# Patient Record
Sex: FEMALE | Race: WHITE
[De-identification: names, ages, dates, MRNs, and addresses within clinical notes are randomized per-mention and may not be internally consistent; named-entity substitution may affect disease eponyms.]

---

## 2017-02-05 ENCOUNTER — HOSPITAL ENCOUNTER (INPATIENT)
Dept: HOSPITAL 45 - C.EDB | Age: 82
LOS: 2 days | Discharge: HOME HEALTH | DRG: 158 | End: 2017-02-07
Attending: HOSPITALIST | Admitting: HOSPITALIST
Payer: COMMERCIAL

## 2017-02-05 VITALS
DIASTOLIC BLOOD PRESSURE: 91 MMHG | SYSTOLIC BLOOD PRESSURE: 169 MMHG | HEART RATE: 81 BPM | TEMPERATURE: 98.96 F | OXYGEN SATURATION: 93 %

## 2017-02-05 VITALS
OXYGEN SATURATION: 94 % | DIASTOLIC BLOOD PRESSURE: 83 MMHG | SYSTOLIC BLOOD PRESSURE: 166 MMHG | TEMPERATURE: 99.5 F | HEART RATE: 75 BPM

## 2017-02-05 VITALS
BODY MASS INDEX: 22.68 KG/M2 | WEIGHT: 141.1 LBS | HEIGHT: 66 IN | HEIGHT: 66 IN | BODY MASS INDEX: 22.68 KG/M2 | WEIGHT: 141.1 LBS

## 2017-02-05 DIAGNOSIS — Z87.891: ICD-10-CM

## 2017-02-05 DIAGNOSIS — Z86.718: ICD-10-CM

## 2017-02-05 DIAGNOSIS — K04.7: Primary | ICD-10-CM

## 2017-02-05 DIAGNOSIS — E03.9: ICD-10-CM

## 2017-02-05 DIAGNOSIS — L03.211: ICD-10-CM

## 2017-02-05 DIAGNOSIS — Z99.3: ICD-10-CM

## 2017-02-05 DIAGNOSIS — M21.821: ICD-10-CM

## 2017-02-05 DIAGNOSIS — M21.851: ICD-10-CM

## 2017-02-05 DIAGNOSIS — G35: ICD-10-CM

## 2017-02-05 DIAGNOSIS — Z96.0: ICD-10-CM

## 2017-02-05 DIAGNOSIS — M21.852: ICD-10-CM

## 2017-02-05 DIAGNOSIS — Z79.899: ICD-10-CM

## 2017-02-05 DIAGNOSIS — Z96.642: ICD-10-CM

## 2017-02-05 DIAGNOSIS — Z79.891: ICD-10-CM

## 2017-02-05 DIAGNOSIS — D64.9: ICD-10-CM

## 2017-02-05 DIAGNOSIS — M81.0: ICD-10-CM

## 2017-02-05 LAB
ANION GAP SERPL CALC-SCNC: 11 MMOL/L (ref 3–11)
BASOPHILS # BLD: 0 K/UL (ref 0–0.2)
BASOPHILS NFR BLD: 0 %
BUN SERPL-MCNC: 6 MG/DL (ref 7–18)
BUN/CREAT SERPL: 11.5 (ref 10–20)
CALCIUM SERPL-MCNC: 9.1 MG/DL (ref 8.5–10.1)
CHLORIDE SERPL-SCNC: 98 MMOL/L (ref 98–107)
CO2 SERPL-SCNC: 28 MMOL/L (ref 21–32)
COMPLETE: YES
CREAT CL PREDICTED SERPL C-G-VRATE: 80.7 ML/MIN
CREAT SERPL-MCNC: 0.52 MG/DL (ref 0.6–1.2)
EOSINOPHIL NFR BLD AUTO: 196 K/UL (ref 130–400)
GLUCOSE SERPL-MCNC: 98 MG/DL (ref 70–99)
HCT VFR BLD CALC: 42.6 % (ref 37–47)
IG%: 0.1 %
IMM GRANULOCYTES NFR BLD AUTO: 6.4 %
INR PPP: 1 (ref 0.9–1.1)
LYMPHOCYTES # BLD: 0.57 K/UL (ref 1.2–3.4)
MCH RBC QN AUTO: 29.9 PG (ref 25–34)
MCHC RBC AUTO-ENTMCNC: 34 G/DL (ref 32–36)
MCV RBC AUTO: 87.8 FL (ref 80–100)
MONOCYTES NFR BLD: 13.9 %
NEUTROPHILS # BLD AUTO: 0 %
NEUTROPHILS NFR BLD AUTO: 79.6 %
PARTIAL THROMBOPLASTIN RATIO: 1
PMV BLD AUTO: 11.4 FL (ref 7.4–10.4)
POTASSIUM SERPL-SCNC: 3.4 MMOL/L (ref 3.5–5.1)
PROTHROMBIN TIME: 10.7 SECONDS (ref 9–12)
RBC # BLD AUTO: 4.85 M/UL (ref 4.2–5.4)
SODIUM SERPL-SCNC: 137 MMOL/L (ref 136–145)
WBC # BLD AUTO: 8.86 K/UL (ref 4.8–10.8)

## 2017-02-05 RX ADMIN — BACLOFEN PRN MG: 20 TABLET ORAL at 22:22

## 2017-02-05 RX ADMIN — SODIUM CHLORIDE AND POTASSIUM CHLORIDE SCH MLS/HR: 9; 1.49 INJECTION, SOLUTION INTRAVENOUS at 20:43

## 2017-02-05 RX ADMIN — Medication SCH TAB: at 20:05

## 2017-02-05 RX ADMIN — LORAZEPAM SCH MG: 1 TABLET ORAL at 22:23

## 2017-02-05 RX ADMIN — HEPARIN SODIUM SCH UNIT: 10000 INJECTION, SOLUTION INTRAVENOUS; SUBCUTANEOUS at 22:30

## 2017-02-05 RX ADMIN — AMITRIPTYLINE HYDROCHLORIDE SCH MG: 10 TABLET, FILM COATED ORAL at 22:20

## 2017-02-05 RX ADMIN — CLINDAMYCIN PHOSPHATE SCH MLS/HR: 150 INJECTION, SOLUTION INTRAVENOUS at 20:44

## 2017-02-05 NOTE — EMERGENCY ROOM VISIT NOTE
History


Report prepared by Ivan:  Keke Kidd


Under the Supervision of:  Dr. Dave Griggs M.D.


First contact with patient:  14:39


Chief Complaint:  OTHER COMPLAINT


Stated Complaint:  SWOLLEN FACE





History of Present Illness


The patient is a 81 year old female who presents to the Emergency Room with 

complaints of worsening right sided jaw swelling that began 2 days ago. The 

patient notes that she initially had some pain to her bottom right first molar 

a few days ago and subsequently developed the swelling. She does have increased 

pain with cold food and liquid on the right side of her mouth. She had a dose 

of Dilaudid about an hour ago.  She is chronically on MS.  Denies fever, body 

aches, chills, vomiting, or other complaints.





   Source of History:  patient


   Onset:  2 days ago


   Position:  other (right jaw)


   Quality:  other (swelling)


   Timing:  worsening


   Associated Symptoms:  No chills, No fevers, No vomiting





Review of Systems


See HPI for pertinent positives & negatives. A total of 10 systems reviewed and 

were otherwise negative.





Past Medical & Surgical


Medical Problems:


(1) Deep venous thrombosis


(2) Diffuse cellulitis of face


(3) Endocarditis


(4) Hypothyroidism


(5) Multiple sclerosis


(6) Osteoporosis


(7) s/p hysterectomy


(8) s/p left ROSI


(9) s/p tonsillectomy








Family History





Cancer


Diabetes mellitus


Heart disease


Hypertension





Social History


Smoking Status:  Former Smoker


Alcohol Use:  none


Occupation Status:  retired





Current/Historical Medications


Scheduled


Amitriptyline Hcl (Elavil *), 20 MG PO HS


Baclofen (Baclofen *), 20 MG PO QID PRN


Calcium/Vitamin D (Caltrate 600 Plus *), 1 TAB PO BID


Cholecalciferol (Vitamin D 400 Iu), 800 INTER.UNIT PO DAILY


Hydromorphone Hcl (Dilaudid), 6 MG PO AS DIRECTED


Hydromorphone Hcl (Dilaudid), 4 MG PO AS DIRECTED


Levothyroxine Sodium (Levoxyl), 0.05 MG PO DAILY


Lorazepam (Ativan *), 1 MG PO BID


Multivitamins/Minerals (Mvi With Minerals), 1 TAB PO DAILY


Polyethylene (Miralax Powder Packet), 17 GM PO DAILY PRN





Scheduled PRN


Furosemide (Lasix), 1 TAB PO DAILY PRN for SWELLING





Allergies


Coded Allergies:  


     Nitrofurantoin (Unverified  Allergy, Severe, FEVER, 2/5/17)


     Ceftriaxone (Verified  Allergy, Mild, 2/5/17)


     Penicillins (Verified  Allergy, Mild, 2/5/17)


     Sulbactam (Verified  Allergy, Mild, 2/5/17)


     Clavulanic Acid (Verified  Allergy, Unknown, "BETA-LACTAMASE INHIBITORS" 

ALLERGY, 2/5/17)


     Rifampin (Verified  Allergy, Unknown, POSSIBLE DRUG FEVER, 2/5/17)


     Tazobactam (Verified  Allergy, Unknown, "BETA-LACTAMASE INHIBITORS" ALLERGY

, 2/5/17)





Physical Exam


Vital Signs











  Date Time  Temp Pulse Resp B/P Pulse Ox O2 Delivery O2 Flow Rate FiO2


 


2/5/17 18:15  87 16 173/91 95 Room Air  


 


2/5/17 16:07  78 18 171/88 93 Room Air  


 


2/5/17 14:15 37.0 92 18 186/90 94 Room Air  











Physical Exam





Constitutional: Vital signs reviewed.


Eyes: Pupils are equal round reactive to light.  Conjunctiva are noninjected.  


ENT: Mild percussion tenderness to the right mandibular first molar. 

Significant soft tissue swelling to the right face with cellulitis extending 

into the neck. No submandibular tenderness or firmness. No elevation of the 

tongue. Mucous membranes are moist.  Neck supple without meningeal signs.


Respiratory: Clear to auscultation bilaterally.  Breath sounds are equal 

bilaterally. 


Cardiovascular: Regular rate and rhythm.  No rubs or gallops.


GI: Soft, nondistended and nontender.  Bowel sounds are present.


Musculoskeletal: No peripheral edema.  No lower extremity tenderness.  


Integumentary: No cyanosis.


Neurological: The patient is awake and alert.  She is wheelchair bound.


Psychiatric: Normal affect.





Medical Decision & Procedures


ER Provider


Diagnostic Interpretation:


CT results as stated below per my review and radiologist interpretation. 











CT neck with intravenous contrast.





HISTORY: Right neck swelling.  eval for abscess/babak





TECHNIQUE: Multiaxial CT images of the neck were performed following the use of


intravenous contrast.





COMPARISON STUDY:  None.





FINDINGS: The paranasal sinuses and mastoid air cells are clear. The visualized


brain parenchyma and orbits are unremarkable. The parotid and submandibular


glands appear symmetric. Difficult evaluation of the neck due to patient's


positioning. The major mucosal airway surfaces appear to be grossly intact.


Prevertebral soft tissues and the epiglottis are normal in thickness. No


pneumothorax. The trachea is patent. Moderate calcified plaque within the


bilateral carotid bifurcations. This results in 30% stenosis at the proximal


right internal carotid artery. No loculated fluid collections to suggest an


abscess. Skin thickening and subcutaneous fat stranding along the right side of


the jaw which appears to extend into the right sublingual area. However, this is


difficult to confirm due to patient's positioning and the dental hardware


artifact. There is also mild edema surrounding the inferior right


sternocleidomastoid muscle. There is also mild edema surrounding the right


masseter muscle. Multiple right lower molar caries and periapical lucencies


which measure up to 5 mm in size. This may account for the right lower


mandibular soft tissue edema.





IMPRESSION:  





1. Difficult evaluation due to patient's positioning.


2. Right lateral mandibular and lower neck subcutaneous fat stranding/edema


which may extend towards the right sublingual space. However, this is difficult


to assess due to patient positioning and the dental hardware artifact. Clinical


correlation recommended to evaluate for Babak's angina. There are no loculated


fluid collections to suggest an abscess within the soft tissues at this time.


3. Right lower molar caries and periapical lucencies which may account for the


right mandibular/neck soft tissue swelling. 











Electronically signed by:  Alejo Schneider M.D.


2/5/2017 5:25 PM





Dictated Date/Time:  2/5/2017 5:14 PM





Laboratory Results


2/5/17 15:40








Red Blood Count 4.85, Mean Corpuscular Volume 87.8, Mean Corpuscular Hemoglobin 

29.9, Mean Corpuscular Hemoglobin Concent 34.0, Mean Platelet Volume 11.4, 

Neutrophils (%) (Auto) 79.6, Lymphocytes (%) (Auto) 6.4, Monocytes (%) (Auto) 

13.9, Eosinophils (%) (Auto) 0.0, Basophils (%) (Auto) 0.0, Neutrophils # (Auto

) 7.05, Lymphocytes # (Auto) 0.57, Monocytes # (Auto) 1.23, Eosinophils # (Auto

) 0.00, Basophils # (Auto) 0.00





2/5/17 15:40

















Test


  2/5/17


15:40


 


White Blood Count


  8.86 K/uL


(4.8-10.8)


 


Red Blood Count


  4.85 M/uL


(4.2-5.4)


 


Hemoglobin


  14.5 g/dL


(12.0-16.0)


 


Hematocrit 42.6 % (37-47) 


 


Mean Corpuscular Volume


  87.8 fL


()


 


Mean Corpuscular Hemoglobin


  29.9 pg


(25-34)


 


Mean Corpuscular Hemoglobin


Concent 34.0 g/dl


(32-36)


 


Platelet Count


  196 K/uL


(130-400)


 


Mean Platelet Volume


  11.4 fL


(7.4-10.4)


 


Neutrophils (%) (Auto) 79.6 % 


 


Lymphocytes (%) (Auto) 6.4 % 


 


Monocytes (%) (Auto) 13.9 % 


 


Eosinophils (%) (Auto) 0.0 % 


 


Basophils (%) (Auto) 0.0 % 


 


Neutrophils # (Auto)


  7.05 K/uL


(1.4-6.5)


 


Lymphocytes # (Auto)


  0.57 K/uL


(1.2-3.4)


 


Monocytes # (Auto)


  1.23 K/uL


(0.11-0.59)


 


Eosinophils # (Auto)


  0.00 K/uL


(0-0.5)


 


Basophils # (Auto)


  0.00 K/uL


(0-0.2)


 


RDW Standard Deviation


  44.7 fL


(36.4-46.3)


 


RDW Coefficient of Variation


  13.9 %


(11.5-14.5)


 


Immature Granulocyte % (Auto) 0.1 % 


 


Immature Granulocyte # (Auto)


  0.01 K/uL


(0.00-0.02)


 


Prothrombin Time


  10.7 SECONDS


(9.0-12.0)


 


Prothromb Time International


Ratio 1.0 (0.9-1.1) 


 


 


Activated Partial


Thromboplast Time 26.6 SECONDS


(21.0-31.0)


 


Partial Thromboplastin Ratio 1.0 


 


Anion Gap


  11.0 mmol/L


(3-11)


 


Est Creatinine Clear Calc


Drug Dose 80.7 ml/min 


 


 


Estimated GFR (


American) 104.6 


 


 


Estimated GFR (Non-


American 90.2 


 


 


BUN/Creatinine Ratio 11.5 (10-20) 


 


Calcium Level


  9.1 mg/dl


(8.5-10.1)





Laboratory results as reviewed by me.





Medications Administered











 Medications


  (Trade)  Dose


 Ordered  Sig/Jaret


 Route  Start Time


 Stop Time Status Last Admin


Dose Admin


 


 Clindamycin


 Phosphate/Dextrose


  (Cleocin Iv/


 Dextrose


 Add-[place] 100ML)  106 ml @ 


 100 mls/hr  ONE  ONCE


 IV  2/5/17 15:00


 2/5/17 16:03 DC 2/5/17 16:02


100 MLS/HR


 


 Hydromorphone HCl


  (Dilaudid Tab)  4 mg  Q3H  PRN


 PO  2/5/17 18:30


 2/19/17 18:29  2/5/17 18:59


4 MG











ED Course


1443: The patient was evaluated in room B8. A complete history and physical 

exam was performed.





1500: Ordered Clindamycin Phosphate 900 mg/Dextrose 106 ml @ 100 mls/hr IV. 





1748: I told the patient her test results and recommended hospitalization after 

I speak with an oral surgeon. 





1754: I discussed the case with Dr. Chaudhary - Oral Surgery. He said that there 

is no need for surgical intervention at this time and he will see the patient 

in the hospital once she is on IV antibiotics and her swelling comes down. 





1800: I reassessed the patient and talked to her about the plan. She was 

agreeable. 





1802: I discussed the case with Dee Dee Posadas PA-C - [facility] 

[facility]. The patient will be evaluated for further management.





Medical Decision


This is an 81-year-old female who presents with right facial pain and swelling.

  Differential diagnosis includes cellulitis, infection, facial abscess, 

periapical abscess, Babak angina.  I did perform a limited focused review of 

portions of the patient's old chart on the electronic medical record. The 

patient has had no recent pertinent visits to this hospital.





I did evaluate the patient as noted above.  IV access was established.  I did 

treat the patient with clindamycin IV.  She did have Dilaudid prior to arrival 

and so not require any pain medication here.  I did order blood cultures.  I 

did order and review the patient's blood work as noted in the electronic 

medical record.  I did order a CT of the soft tissue neck.  I did review the 

images myself as well as the radiology report as described above.  She does 

have significant facial cellulitis.  I do not believe that clinically she has a 

Babak's angina.  There is no elevation of tongue or firmness in the submental 

region.  She does have some swelling on the right jaw but this is mostly 

redundant tissue.  I did discuss the test results with the patient.  I did 

recommend hospitalization for IV antibiotics.  I did discuss the case with Dr. Chaudhary of oral surgery.  I did discuss case with the hospitalist and case 

manager.





Consults


Time Called:  1750


Consulting Physician:  Dr. Chaudhary - Oral Surgery


Returned Call:  [phone]


I discussed the case with him. He said that there is no need for surgical 

intervention at this time and he will see the patient in the hospital once she 

is on IV antibiotics and her swelling comes down.


Additional Consults:  


   Time Called:  1758


   Consulted Physician:  [facility]


   Returned Call:  [phone]


Additional Comments:


I discussed the case with her. The patient will be evaluated for further 

management.





Impression





 Primary Impression:  


 Facial cellulitis


 Additional Impression:  


 Periapical abscess





Scribe Attestation


The scribe's documentation has been prepared under my direct and personally 

reviewed by me in its entirety. I confirm that the note above accurately 

reflects all work, treatment, procedures, and medical decision making performed 

by me.





Departure Information


Dispostion


Being Evaluated By Hospitalist





Referrals


Dennis Simmons D.O. (PCP)





Patient Instructions


My [facility]





Problem Qualifiers

## 2017-02-05 NOTE — DIAGNOSTIC IMAGING REPORT
CT neck with intravenous contrast.



HISTORY: Right neck swelling.  eval for abscess/bhavik



TECHNIQUE: Multiaxial CT images of the neck were performed following the use of

intravenous contrast.



COMPARISON STUDY:  None.



FINDINGS: The paranasal sinuses and mastoid air cells are clear. The visualized

brain parenchyma and orbits are unremarkable. The parotid and submandibular

glands appear symmetric. Difficult evaluation of the neck due to patient's

positioning. The major mucosal airway surfaces appear to be grossly intact.

Prevertebral soft tissues and the epiglottis are normal in thickness. No

pneumothorax. The trachea is patent. Moderate calcified plaque within the

bilateral carotid bifurcations. This results in 30% stenosis at the proximal

right internal carotid artery. No loculated fluid collections to suggest an

abscess. Skin thickening and subcutaneous fat stranding along the right side of

the jaw which appears to extend into the right sublingual area. However, this is

difficult to confirm due to patient's positioning and the dental hardware

artifact. There is also mild edema surrounding the inferior right

sternocleidomastoid muscle. There is also mild edema surrounding the right

masseter muscle. Multiple right lower molar caries and periapical lucencies

which measure up to 5 mm in size. This may account for the right lower

mandibular soft tissue edema.



IMPRESSION:  



1. Difficult evaluation due to patient's positioning.

2. Right lateral mandibular and lower neck subcutaneous fat stranding/edema

which may extend towards the right sublingual space. However, this is difficult

to assess due to patient positioning and the dental hardware artifact. Clinical

correlation recommended to evaluate for Bhavik's angina. There are no loculated

fluid collections to suggest an abscess within the soft tissues at this time.

3. Right lower molar caries and periapical lucencies which may account for the

right mandibular/neck soft tissue swelling. 







Electronically signed by:  Alejo Schneider M.D.

2/5/2017 5:25 PM



Dictated Date/Time:  2/5/2017 5:14 PM

## 2017-02-06 VITALS — OXYGEN SATURATION: 95 %

## 2017-02-06 VITALS
DIASTOLIC BLOOD PRESSURE: 80 MMHG | HEART RATE: 84 BPM | OXYGEN SATURATION: 90 % | TEMPERATURE: 99.5 F | SYSTOLIC BLOOD PRESSURE: 158 MMHG

## 2017-02-06 VITALS
HEART RATE: 87 BPM | DIASTOLIC BLOOD PRESSURE: 94 MMHG | OXYGEN SATURATION: 92 % | TEMPERATURE: 98.6 F | SYSTOLIC BLOOD PRESSURE: 159 MMHG

## 2017-02-06 VITALS — OXYGEN SATURATION: 90 %

## 2017-02-06 VITALS
OXYGEN SATURATION: 90 % | HEART RATE: 73 BPM | DIASTOLIC BLOOD PRESSURE: 71 MMHG | TEMPERATURE: 98.6 F | SYSTOLIC BLOOD PRESSURE: 125 MMHG

## 2017-02-06 LAB
ANION GAP SERPL CALC-SCNC: 11 MMOL/L (ref 3–11)
BUN SERPL-MCNC: 6 MG/DL (ref 7–18)
BUN/CREAT SERPL: 16.7 (ref 10–20)
CALCIUM SERPL-MCNC: 8.4 MG/DL (ref 8.5–10.1)
CHLORIDE SERPL-SCNC: 101 MMOL/L (ref 98–107)
CO2 SERPL-SCNC: 24 MMOL/L (ref 21–32)
CREAT CL PREDICTED SERPL C-G-VRATE: 113.5 ML/MIN
CREAT SERPL-MCNC: 0.37 MG/DL (ref 0.6–1.2)
EOSINOPHIL NFR BLD AUTO: 192 K/UL (ref 130–400)
GLUCOSE SERPL-MCNC: 134 MG/DL (ref 70–99)
HCT VFR BLD CALC: 38.9 % (ref 37–47)
MCH RBC QN AUTO: 30 PG (ref 25–34)
MCHC RBC AUTO-ENTMCNC: 34.2 G/DL (ref 32–36)
MCV RBC AUTO: 87.8 FL (ref 80–100)
PMV BLD AUTO: 11 FL (ref 7.4–10.4)
POTASSIUM SERPL-SCNC: 3.7 MMOL/L (ref 3.5–5.1)
RBC # BLD AUTO: 4.43 M/UL (ref 4.2–5.4)
SODIUM SERPL-SCNC: 136 MMOL/L (ref 136–145)
WBC # BLD AUTO: 7.78 K/UL (ref 4.8–10.8)

## 2017-02-06 RX ADMIN — CHOLECALCIFEROL TAB 10 MCG (400 UNIT) SCH INTER.UNIT: 10 TAB at 09:00

## 2017-02-06 RX ADMIN — LACTOBACILLUS TAB SCH TAB: TAB at 18:51

## 2017-02-06 RX ADMIN — BACLOFEN PRN MG: 20 TABLET ORAL at 22:13

## 2017-02-06 RX ADMIN — HEPARIN SODIUM SCH UNIT: 10000 INJECTION, SOLUTION INTRAVENOUS; SUBCUTANEOUS at 22:11

## 2017-02-06 RX ADMIN — LORAZEPAM SCH MG: 1 TABLET ORAL at 22:05

## 2017-02-06 RX ADMIN — AMITRIPTYLINE HYDROCHLORIDE SCH MG: 10 TABLET, FILM COATED ORAL at 22:05

## 2017-02-06 RX ADMIN — LORAZEPAM SCH MG: 1 TABLET ORAL at 09:11

## 2017-02-06 RX ADMIN — Medication SCH TAB: at 09:00

## 2017-02-06 RX ADMIN — HEPARIN SODIUM SCH UNIT: 10000 INJECTION, SOLUTION INTRAVENOUS; SUBCUTANEOUS at 05:39

## 2017-02-06 RX ADMIN — LACTOBACILLUS TAB SCH TAB: TAB at 09:11

## 2017-02-06 RX ADMIN — LACTOBACILLUS TAB SCH TAB: TAB at 13:12

## 2017-02-06 RX ADMIN — CLINDAMYCIN PHOSPHATE SCH MLS/HR: 150 INJECTION, SOLUTION INTRAVENOUS at 22:07

## 2017-02-06 RX ADMIN — Medication SCH TAB: at 22:13

## 2017-02-06 RX ADMIN — HEPARIN SODIUM SCH UNIT: 10000 INJECTION, SOLUTION INTRAVENOUS; SUBCUTANEOUS at 14:29

## 2017-02-06 RX ADMIN — BACLOFEN PRN MG: 20 TABLET ORAL at 09:13

## 2017-02-06 RX ADMIN — LEVOTHYROXINE SODIUM SCH MCG: 50 TABLET ORAL at 05:41

## 2017-02-06 RX ADMIN — CLINDAMYCIN PHOSPHATE SCH MLS/HR: 150 INJECTION, SOLUTION INTRAVENOUS at 10:15

## 2017-02-06 RX ADMIN — Medication SCH GM: at 09:11

## 2017-02-06 RX ADMIN — SODIUM CHLORIDE AND POTASSIUM CHLORIDE SCH MLS/HR: 9; 1.49 INJECTION, SOLUTION INTRAVENOUS at 22:07

## 2017-02-06 RX ADMIN — CLINDAMYCIN PHOSPHATE SCH MLS/HR: 150 INJECTION, SOLUTION INTRAVENOUS at 03:33

## 2017-02-06 RX ADMIN — SODIUM CHLORIDE AND POTASSIUM CHLORIDE SCH MLS/HR: 9; 1.49 INJECTION, SOLUTION INTRAVENOUS at 09:11

## 2017-02-06 RX ADMIN — CLINDAMYCIN PHOSPHATE SCH MLS/HR: 150 INJECTION, SOLUTION INTRAVENOUS at 15:55

## 2017-02-06 RX ADMIN — BACLOFEN PRN MG: 20 TABLET ORAL at 15:55

## 2017-02-06 NOTE — PROGRESS NOTE
Internal Med Progress Note


Date of Service:


Feb 6, 2017.


Provider Documentation:





SUBJECTIVE:





The patient was seen and examined


Swelling and pain over the right facial area are better 


No fever,chills 








OBJECTIVE:





Vital Signs-as noted below





Exam:


General-No distress 


Eyes-normal


ENT-normal


Neck-Right lower facial swelling with induration over right lower lateral Jaw


Associated swelling of the upper part of the neck 


Redness and tenderness are better 


Lungs-clear to ausucltate bilaterally


Heart-Regular


Abdomen-Benign,no masses,bowel sound present 


Extremities-No edema  


Neuro-AA


Has MS and weakness in legs and right UE deformity





Lab data as noted below.


ASSESSMENT & PLAN:








Right lateral mandibular and lower face Cellulitis


No definite Abscess on CT scan  


Ongoing tooth problem in that same area


Has been on Clindamycin 


Blood culture pending


Discussed with the Orofacial surgery-will need the tooth to come and will be 

done as an OP


Clinically much better 


Likely discharge tomorrow





Multiple sclerosis with chronic indwelling Casper catheter.  


No acute symptoms at this time, 


she has been having more weakness involving the left side.  


She does have deformities in the right side and also in the legs from multiple 

sclerosis.





Hypothyroidism.  Continue with current medications.





History of osteoporosis.  Continue with the supplement.





Gastrointestinal prophylaxis with Maalox and Mylanta.





Deep venous thrombosis prophylaxis with subQ heparin.





Code status.  She will be a full code.


 





Vital Signs:











  Date Time  Temp Pulse Resp B/P Pulse Ox O2 Delivery O2 Flow Rate FiO2


 


2/6/17 10:09     90 Room Air  


 


2/6/17 07:52 37.5 84 20 158/80 90 Room Air  


 


2/6/17 07:45      Room Air  


 


2/6/17 00:05      Room Air  


 


2/5/17 22:48 37.5 75 14 166/83 94 Room Air  


 


2/5/17 19:47 37.2 81 20 169/91 93 Room Air  


 


2/5/17 19:17  82 18 166/90 91 Room Air  


 


2/5/17 18:15  87 16 173/91 95 Room Air  


 


2/5/17 16:07  78 18 171/88 93 Room Air  


 


2/5/17 14:15 37.0 92 18 186/90 94 Room Air  








Lab Results:





Results Past 24 Hours








Test


  2/5/17


15:40 2/6/17


05:10 2/6/17


07:45 Range/Units


 


 


White Blood Count 8.86 7.78  4.8-10.8  K/uL


 


Red Blood Count 4.85 4.43  4.2-5.4  M/uL


 


Hemoglobin 14.5 13.3  12.0-16.0  g/dL


 


Hematocrit 42.6 38.9  37-47  %


 


Mean Corpuscular Volume 87.8 87.8    fL


 


Mean Corpuscular Hemoglobin 29.9 30.0  25-34  pg


 


Mean Corpuscular Hemoglobin


Concent 34.0


  34.2


  


  32-36  g/dl


 


 


Platelet Count 196 192  130-400  K/uL


 


Mean Platelet Volume 11.4 11.0  7.4-10.4  fL


 


Neutrophils (%) (Auto) 79.6    %


 


Lymphocytes (%) (Auto) 6.4    %


 


Monocytes (%) (Auto) 13.9    %


 


Eosinophils (%) (Auto) 0.0    %


 


Basophils (%) (Auto) 0.0    %


 


Neutrophils # (Auto) 7.05   1.4-6.5  K/uL


 


Lymphocytes # (Auto) 0.57   1.2-3.4  K/uL


 


Monocytes # (Auto) 1.23   0.11-0.59  K/uL


 


Eosinophils # (Auto) 0.00   0-0.5  K/uL


 


Basophils # (Auto) 0.00   0-0.2  K/uL


 


RDW Standard Deviation 44.7 44.9  36.4-46.3  fL


 


RDW Coefficient of Variation 13.9 14.0  11.5-14.5  %


 


Immature Granulocyte % (Auto) 0.1    %


 


Immature Granulocyte # (Auto) 0.01   0.00-0.02  K/uL


 


Prothrombin Time


  10.7


  


  


  9.0-12.0


SECONDS


 


Prothromb Time International


Ratio 1.0


  


  


  0.9-1.1  


 


 


Activated Partial


Thromboplast Time 26.6


  


  


  21.0-31.0


SECONDS


 


Partial Thromboplastin Ratio 1.0    


 


Sodium Level 137 136  136-145  mmol/L


 


Potassium Level 3.4 3.7  3.5-5.1  mmol/L


 


Chloride Level 98 101    mmol/L


 


Carbon Dioxide Level 28 24  21-32  mmol/L


 


Anion Gap 11.0 11.0  3-11  mmol/L


 


Blood Urea Nitrogen 6 6  7-18  mg/dl


 


Creatinine


  0.52


  0.37


  


  0.60-1.20


mg/dl


 


Est Creatinine Clear Calc


Drug Dose 80.7


  113.5


  


   ml/min


 


 


Estimated GFR (


American) 104.6


  117.0


  


   


 


 


Estimated GFR (Non-


American 90.2


  100.9


  


   


 


 


BUN/Creatinine Ratio 11.5 16.7  10-20  


 


Random Glucose 98 134  70-99  mg/dl


 


Calcium Level 9.1 8.4  8.5-10.1  mg/dl


 


Bedside Glucose   115 70-90  mg/dl








 Microbiology Results


2/5/17 Blood Culture, Received


         Pending


2/5/17 Blood Culture, Received


         Pending

## 2017-02-06 NOTE — HISTORY & PHYSICAL EXAMINATION
DATE OF ADMISSION:  02/05/2017

 

PRIMARY CARE PHYSICIAN:  Dr. Simmons.

 

CHIEF COMPLAINT:  Swelling of the right side of the face for the last 2 days.

 

HISTORY OF PRESENT ILLNESS:  Swelling of the right side of the face for the 
last 2 days and also

she did have fever documented at home of  about 99 degrees Fahrenheit. 

She came to the ER with these symptoms.  She does not have any problem with

breathing, does not have any problem with swallowing.  In the ER, she was

noted to have swollen right face on the lower side with induration on

palpation and also tenderness on palpation with increased local temperature

and redness.  A CT scan of the soft tissue of the neck that was done today

documented as right lateral mandibular and lower neck subcutaneous fat

stranding/edema which may extend toward the right sublingual space.  There is

no loculated fluid collection suggesting abscess within the soft tissue at

this time.  Right lower molar caries and periapical lucencies, which may

account for the right mandibular neck/soft tissue swelling.  From that point,

the ENT on-call physician was consulted and she was started with IV

antibiotic and advised for admission.

 

PAST MEDICAL HISTORY:  Significant for multiple sclerosis affecting the legs

and right side of the body with involvement of the bladder, history of deep

venous thrombosis, osteoporosis, history of hepatitis and infective Endocarditis
,

 mitral valve regurgitation, persistent insomnia,

hypothyroidism and chronic anemia.

 

PAST SURGICAL HISTORY:  Significant for cystoscopy, partial hysterectomy,

tonsillectomy as a child, total hip replacement on the left side and

treatment of collapsed lung in 1976.

 

FAMILY HISTORY:  Father had heart disorder.  Sister has breast cancer, mother

had a pacemaker and sister with hypertension.

 

SOCIAL HISTORY:  She is .  She quit smoking 01/01/1976.  Does not use

any alcohol.  She is mostly bedbound and mobile with a wheelchair and she

gets help with ADLs most of the time.

 

REVIEW OF SYSTEMS:  Systemic review was unremarkable except those mentioned

in history of present complaint.

 

ALLERGIES:  ALLERGIC TO CEFTRIAXONE, CLAVULANIC ACID, NITROFURANTOIN,

PENICILLIN, RIFAMPIN, SULBACTAM AND TAZOBACTAM.

 

MEDICATIONS:  She has been on furosemide 40 mg 1 tablet daily, amitriptyline

10 mg tablet 20 mg daily, baclofen 20 mg q.i.d. as needed, calcium with

vitamin D 1 tablet twice daily, vitamin D 400 unit tablet 2 tablets daily,

hydromorphone/Dilaudid 4 mg as directed, also Dilaudid 6 mg as directed,

levothyroxine sodium 50 mcg daily, lorazepam 1 mg b.i.d., multivitamin 1

tablet daily, MiraLax 17 grams daily.

 

PHYSICAL EXAMINATION:

GENERAL:  On examination in the Emergency Room, she was not having any acute

distress, but she complained of some pain in the right of the face.

VITAL SIGNS:  Temperature 37.0, pulse 87, blood pressure 173/91, saturation

95% on room air.

HEENT:  Remarkable for right lower facial swelling with induration, redness

and increased local temperature and tenderness involving the right lower jaw

area.  No definite inflammation noted inside the mouth.

NECK:  Supple.

CHEST:  Clear to auscultation bilaterally with decreased breath sounds.

HEART:  S1, S2 with a 2/6 systolic murmur in the precordium.

ABDOMEN:  Soft, benign, nontender.

EXTREMITIES:  Trace edema bilaterally.

EXAMINATION OF THE MUSCULOSKELETAL SYSTEM:  No acute arthritis.

CENTRAL NERVOUS SYSTEM:  She was alert, awake.  She has multiple sclerosis

with flexion deformity involving the right upper extremity and also extension

deformity involving the legs, more on the right than the left and she has

chronic indwelling Casper catheter.

 

LABS NOTED TODAY:  White count was 8.86, H\T\H 14.5/42.6, platelet was 196. 

Sodium 137, potassium 3.4, chloride 98, carbon dioxide 28, BUN 6, creatinine

0.52, calcium 9.1.  Coagulation profile:  PT/INR unremarkable.  Imaging

scans:  CT of the soft tissue of the neck reported as difficult evaluation

due to patient's positioning.  Right lateral mandibular and lower neck

subcutaneous fat stranding/edema which may extend toward the right sublingual

space; however, this is difficult to asses due to patient's positioning and

the dental hardware defect.  Clinical correlation is recommended to evaluate

for abscess.  There is no loculated fluid collection to suggest

an abscess within the soft tissue at this time.  Right lower molar caries and

periapical lucencies which may account for the right mandibular neck/soft

tissue swelling.

 

IMPRESSION AND PLAN:

1.  Right lateral mandibular and lower face swelling.  The patient will be

admitted to medical floor.  She may have cellulitis and/or dental abscess. 

Blood cultures were taken and she was started with intravenous clindamycin. 

The Emergency Room physician did talk to on-call oral surgeon.  The patient

will be evaluated while in the hospital from him.  Pain medications as

needed.

2.  Multiple sclerosis with chronic indwelling Casper catheter.  No acute

symptoms at this time, but she mentioned that she has been having more

weakness involving the left side.  She does have deformities in the right

side and also in the legs from multiple sclerosis.

3.  Hypothyroidism.  Continue with current medications.

4.  History of osteoporosis.  Continue with the supplement.

5.  Gastrointestinal prophylaxis with Maalox and Mylanta.

6.  Deep venous thrombosis prophylaxis with subQ heparin.

7.  Code status.  She will be a full code.

 

In my clinical judgment, the beneficiary meets criteria as per CMS for

2-midnight stay in the hospital.

 

 

 

GRADY

## 2017-02-07 VITALS
SYSTOLIC BLOOD PRESSURE: 135 MMHG | DIASTOLIC BLOOD PRESSURE: 75 MMHG | HEART RATE: 72 BPM | TEMPERATURE: 98.6 F | OXYGEN SATURATION: 90 %

## 2017-02-07 VITALS
HEART RATE: 72 BPM | TEMPERATURE: 98.6 F | DIASTOLIC BLOOD PRESSURE: 75 MMHG | OXYGEN SATURATION: 90 % | SYSTOLIC BLOOD PRESSURE: 135 MMHG

## 2017-02-07 RX ADMIN — Medication SCH GM: at 09:00

## 2017-02-07 RX ADMIN — Medication SCH TAB: at 09:15

## 2017-02-07 RX ADMIN — LACTOBACILLUS TAB SCH TAB: TAB at 12:33

## 2017-02-07 RX ADMIN — HEPARIN SODIUM SCH UNIT: 10000 INJECTION, SOLUTION INTRAVENOUS; SUBCUTANEOUS at 13:56

## 2017-02-07 RX ADMIN — BACLOFEN PRN MG: 20 TABLET ORAL at 09:13

## 2017-02-07 RX ADMIN — LACTOBACILLUS TAB SCH TAB: TAB at 09:11

## 2017-02-07 RX ADMIN — Medication SCH TAB: at 09:00

## 2017-02-07 RX ADMIN — CHOLECALCIFEROL TAB 10 MCG (400 UNIT) SCH INTER.UNIT: 10 TAB at 09:00

## 2017-02-07 RX ADMIN — CLINDAMYCIN PHOSPHATE SCH MLS/HR: 150 INJECTION, SOLUTION INTRAVENOUS at 03:45

## 2017-02-07 RX ADMIN — CLINDAMYCIN PHOSPHATE SCH MLS/HR: 150 INJECTION, SOLUTION INTRAVENOUS at 09:19

## 2017-02-07 RX ADMIN — HEPARIN SODIUM SCH UNIT: 10000 INJECTION, SOLUTION INTRAVENOUS; SUBCUTANEOUS at 05:53

## 2017-02-07 RX ADMIN — BACLOFEN PRN MG: 20 TABLET ORAL at 05:56

## 2017-02-07 RX ADMIN — LORAZEPAM SCH MG: 1 TABLET ORAL at 09:18

## 2017-02-07 RX ADMIN — LEVOTHYROXINE SODIUM SCH MCG: 50 TABLET ORAL at 05:33

## 2017-02-07 RX ADMIN — SODIUM CHLORIDE AND POTASSIUM CHLORIDE SCH MLS/HR: 9; 1.49 INJECTION, SOLUTION INTRAVENOUS at 12:33

## 2017-02-07 NOTE — DISCHARGE INSTRUCTIONS
Discharge Instructions


Admission


Reason for Admission:  Diffuse Cellulitis Of Face





Discharge


Discharge Diagnosis / Problem:  Facila Cellulitis,Infected tooth





Discharge Goals


Goal(s):  Prevent Disease Progression





Activity Recommendations


Activity Limitations:  resume your previous activity





.





Instructions / Follow-Up


Instructions / Follow-Up


Dr Simmons on 2/13/17 at 11:10AM.Dr Chaudhary's office will call for appointment





Current Hospital Diet


Patient's current hospital diet: Regular Diet





Discharge Diet


Recommended Diet:  Regular Diet





Pending Studies


Studies pending at discharge:  no





Medical Emergencies








.


Who to Call and When:





Medical Emergencies:  If at any time you feel your situation is an emergency, 

please call 911 immediately.





.





Non-Emergent Contact


Non-Emergency issues call your:  Primary Care Provider





.


.





"Provider Documentation" section prepared by Lola Davis.





VTE Core Measure


Inpt VTE Proph given/why not?:  Unfractionated heparin SQ

## 2017-02-07 NOTE — PROGRESS NOTE
Internal Med Progress Note


Date of Service:


Feb 7, 2017.


Provider Documentation:





SUBJECTIVE:





The patient was seen and examined


Swelling and pain over the right facial area are much better  


No fever,chills 


Remains stable 











OBJECTIVE:





Vital Signs-as noted below





Exam:


General-No distress at rest


Eyes-normal


ENT-normal


Neck-Right lower facial swelling with induration over right lower lateral Jaw


Associated swelling of the upper part of the neck 


Redness and tenderness are better 


No fluctuation but still has the induration 


Lungs-clear to ausucltate bilaterally


Heart-Regular


Abdomen-Benign,no masses,bowel sound present 


Extremities-No edema  


Neuro-AA


Has MS and weakness in legs and right UE deformity





Lab data as noted below.


ASSESSMENT & PLAN:








Right lateral mandibular and lower face Cellulitis


No definite Abscess on CT scan  


Ongoing tooth problem in that same area


Has been on Clindamycin 


Blood culture pending


Discussed with the Orofacial surgery-will need the tooth to come and will be 

done as an OP


Clinically much better 


Only Induration but no fluctuation


Discussed with  Dr Chaudhary -will see her tomorrow AM





Multiple sclerosis with chronic indwelling Casper catheter.  


No acute symptoms at this time, 


she has been having more weakness involving the left side.  


She does have deformities in the right side and also in the legs from multiple 

sclerosis.





Hypothyroidism.  Continue with current medications.





History of osteoporosis.  Continue with the supplement.





Gastrointestinal prophylaxis with Maalox and Mylanta.





Deep venous thrombosis prophylaxis with subQ heparin.





Code status.  She will be a full code.


Discharge today


 





Vital Signs:











  Date Time  Temp Pulse Resp B/P Pulse Ox O2 Delivery O2 Flow Rate FiO2


 


2/7/17 07:41      Room Air  


 


2/7/17 07:41 37.0 72 18 135/75 90 Room Air  


 


2/6/17 23:45      Room Air  92


 


2/6/17 23:18 37.0 87 18 159/94 92 Room Air  


 


2/6/17 15:45     90   


 


2/6/17 15:15 37.0 73 16 125/71 90 Room Air

## 2017-02-08 NOTE — DISCHARGE SUMMARY
Discharge Summary


Admission Date:


Feb 5, 2017 at 18:46


Discharge Date:  Feb 7, 2017


Discharge Disposition:  Home with services


Principal Diagnosis:


Right facial Cellulitis/Abscess.Dental Infection


Secondary Diagnoses/Problems:


Please see H&P


Medication Reconciliation


New Medications:  


Clindamycin Hcl (Cleocin) 300 Mg Cap


300 MG PO TID for 7 Days, #21 CAP





Lactobacillus Acidophilus (Floranex) 1 Tab Tab


2 TAB PO BID for 10 Days, #40 TAB





 


Continued Medications:  


Amitriptyline Hcl (Elavil *) 10 Mg Tab


20 MG PO HS





Baclofen (Baclofen *) 10 Mg Tab


20 MG PO QID PRN





Calcium/Vitamin D (Caltrate 600 Plus *)  Tab


1 TAB PO BID





Cholecalciferol (Vitamin D 400 Iu) 400 Unit Cap


800 INTER.UNIT PO DAILY, CAP





Furosemide (Lasix) 40 Mg Tab


1 TAB PO DAILY PRN for SWELLING for 30 Days, #30 TAB 5 Refills





Hydromorphone Hcl (Dilaudid) 2 Mg Tab


6 MG PO AS DIRECTED, TAB


Q 3 HOURS FOR SEVERE PAIN


Hydromorphone Hcl (Dilaudid) 2 Mg Tab


4 MG PO AS DIRECTED, TAB


Q 3 HOURS FOR MOERATE PAIN


Levothyroxine Sodium (Levoxyl) 50 Mcg Tab


0.05 MG PO DAILY, TAB





Lorazepam (Ativan *) 1 Mg Tab


1 MG PO BID





Multivitamins/Minerals (Mvi With Minerals)  Tab


1 TAB PO DAILY, TAB





Polyethylene (Miralax Powder Packet) 17 Gm Pack


17 GM PO DAILY PRN, PACK











Admission Information


HPI (per Admitting provider):


DATE OF ADMISSION:  02/05/2017


 


PRIMARY CARE PHYSICIAN:  Dr. Simmons.


 


CHIEF COMPLAINT:  Swelling of the right side of the face for the last 2 days.


 


HISTORY OF PRESENT ILLNESS:  Swelling of the right side of the face for the 

last 2 days and also


she did have fever documented at home of  about 99 degrees Fahrenheit. 


She came to the ER with these symptoms.  She does not have any problem with


breathing, does not have any problem with swallowing.  In the ER, she was


noted to have swollen right face on the lower side with induration on


palpation and also tenderness on palpation with increased local temperature


and redness.  A CT scan of the soft tissue of the neck that was done today


documented as right lateral mandibular and lower neck subcutaneous fat


stranding/edema which may extend toward the right sublingual space.  There is


no loculated fluid collection suggesting abscess within the soft tissue at


this time.  Right lower molar caries and periapical lucencies, which may


account for the right mandibular neck/soft tissue swelling.  From that point,


the ENT on-call physician was consulted and she was started with IV


antibiotic and advised for admission.


 


PAST MEDICAL HISTORY:  Significant for multiple sclerosis affecting the legs


and right side of the body with involvement of the bladder, history of deep


venous thrombosis, osteoporosis, history of hepatitis and infective Endocarditis

,


 mitral valve regurgitation, persistent insomnia,


hypothyroidism and chronic anemia.


 


PAST SURGICAL HISTORY:  Significant for cystoscopy, partial hysterectomy,


tonsillectomy as a child, total hip replacement on the left side and


treatment of collapsed lung in 1976.


 


FAMILY HISTORY:  Father had heart disorder.  Sister has breast cancer, mother


had a pacemaker and sister with hypertension.


 


SOCIAL HISTORY:  She is .  She quit smoking 01/01/1976.  Does not use


any alcohol.  She is mostly bedbound and mobile with a wheelchair and she


gets help with ADLs most of the time.


 


REVIEW OF SYSTEMS:  Systemic review was unremarkable except those mentioned


in history of present complaint.


 


ALLERGIES:  ALLERGIC TO CEFTRIAXONE, CLAVULANIC ACID, NITROFURANTOIN,


PENICILLIN, RIFAMPIN, SULBACTAM AND TAZOBACTAM.


 


MEDICATIONS:  She has been on furosemide 40 mg 1 tablet daily, amitriptyline


10 mg tablet 20 mg daily, baclofen 20 mg q.i.d. as needed, calcium with


vitamin D 1 tablet twice daily, vitamin D 400 unit tablet 2 tablets daily,


hydromorphone/Dilaudid 4 mg as directed, also Dilaudid 6 mg as directed,


levothyroxine sodium 50 mcg daily, lorazepam 1 mg b.i.d., multivitamin 1


tablet daily, MiraLax 17 grams daily.


 


PHYSICAL EXAMINATION:


GENERAL:  On examination in the Emergency Room, she was not having any acute


distress, but she complained of some pain in the right of the face.


VITAL SIGNS:  Temperature 37.0, pulse 87, blood pressure 173/91, saturation


95% on room air.


HEENT:  Remarkable for right lower facial swelling with induration, redness


and increased local temperature and tenderness involving the right lower jaw


area.  No definite inflammation noted inside the mouth.


NECK:  Supple.


CHEST:  Clear to auscultation bilaterally with decreased breath sounds.


HEART:  S1, S2 with a 2/6 systolic murmur in the precordium.


ABDOMEN:  Soft, benign, nontender.


EXTREMITIES:  Trace edema bilaterally.


EXAMINATION OF THE MUSCULOSKELETAL SYSTEM:  No acute arthritis.


CENTRAL NERVOUS SYSTEM:  She was alert, awake.  She has multiple sclerosis


with flexion deformity involving the right upper extremity and also extension


deformity involving the legs, more on the right than the left and she has


chronic indwelling Casper catheter.


 


LABS NOTED TODAY:  White count was 8.86, H\T\H 14.5/42.6, platelet was 196. 


Sodium 137, potassium 3.4, chloride 98, carbon dioxide 28, BUN 6, creatinine


0.52, calcium 9.1.  Coagulation profile:  PT/INR unremarkable.  Imaging


scans:  CT of the soft tissue of the neck reported as difficult evaluation


due to patient's positioning.  Right lateral mandibular and lower neck


subcutaneous fat stranding/edema which may extend toward the right sublingual


space; however, this is difficult to asses due to patient's positioning and


the dental hardware defect.  Clinical correlation is recommended to evaluate


for abscess.  There is no loculated fluid collection to suggest


an abscess within the soft tissue at this time.  Right lower molar caries and


periapical lucencies which may account for the right mandibular neck/soft


tissue swelling.


 


IMPRESSION AND PLAN:


1.  Right lateral mandibular and lower face swelling.  The patient will be


admitted to medical floor.  She may have cellulitis and/or dental abscess. 


Blood cultures were taken and she was started with intravenous clindamycin. 


The Emergency Room physician did talk to on-call oral surgeon.  The patient


will be evaluated while in the hospital from him.  Pain medications as


needed.


2.  Multiple sclerosis with chronic indwelling Casper catheter.  No acute


symptoms at this time, but she mentioned that she has been having more


weakness involving the left side.  She does have deformities in the right


side and also in the legs from multiple sclerosis.


3.  Hypothyroidism.  Continue with current medications.


4.  History of osteoporosis.  Continue with the supplement.


5.  Gastrointestinal prophylaxis with Maalox and Mylanta.


6.  Deep venous thrombosis prophylaxis with subQ heparin.


7.  Code status.  She will be a full code.


 


In my clinical judgment, the beneficiary meets criteria as per CMS for


2-midnight stay in the hospital.





Hospital Course








Right lateral mandibular and lower face Cellulitis


No definite Abscess on CT scan  


Ongoing tooth problem in that same area


Has been on Clindamycin 


Blood culture pending


Discussed with the Orofacial surgery-will need the tooth to come and will be 

done as an OP


Clinically much better 


Only Induration but no fluctuation


Discussed with  Dr Chaudhary -will see her tomorrow AM





Multiple sclerosis with chronic indwelling Casper catheter.  


No acute symptoms at this time, 


she has been having more weakness involving the left side.  


She does have deformities in the right side and also in the legs from multiple 

sclerosis.





Hypothyroidism.  Continue with current medications.





History of osteoporosis.  Continue with the supplement.





Gastrointestinal prophylaxis with Maalox and Mylanta.





Deep venous thrombosis prophylaxis with subQ heparin.





Code status.  She will be a full code.


Discharge today


 





Total time spent on discharge = 35 minutes


This includes examination of the patient, discharge planning, medication 

reconciliation, and communication with other providers.





Discharge Instructions


Admission


Reason for Admission:  Diffuse Cellulitis Of Face





Discharge


Discharge Diagnosis / Problem:  Facial Cellulitis,Infected tooth





Discharge Goals


Goal(s):  Prevent Disease Progression





Activity Recommendations


Activity Limitations:  resume your previous activity





.





Instructions / Follow-Up


Instructions / Follow-Up


Dr Simmons on 2/13/17 at 11:10AM.Dr Chauhdary's office will call for appointment





Current Hospital Diet


Patient's current hospital diet: Regular Diet





Discharge Diet


Recommended Diet:  Regular Diet





Pending Studies


Studies pending at discharge:  no





Medical Emergencies








.


Who to Call and When:





Medical Emergencies:  If at any time you feel your situation is an emergency, 

please call 911 immediately.





.





Non-Emergent Contact


Non-Emergency issues call your:  Primary Care Provider





.


.





"Provider Documentation" section prepared by Lola Davis.





VTE Core Measure


Inpt VTE Proph given/why not?:  Unfractionated heparin SQ











<Electronically signed by Lola Davis M.D.>





Additional Copies To


Dennis Simmons D.O.

## 2018-07-31 ENCOUNTER — HOSPITAL ENCOUNTER (OUTPATIENT)
Dept: HOSPITAL 45 - C.LABSPEC | Age: 83
Discharge: HOME | End: 2018-07-31
Attending: INTERNAL MEDICINE
Payer: COMMERCIAL

## 2018-07-31 DIAGNOSIS — D50.9: Primary | ICD-10-CM

## 2018-07-31 LAB
BUN SERPL-MCNC: 5 MG/DL (ref 7–18)
CALCIUM SERPL-MCNC: 8.4 MG/DL (ref 8.5–10.1)
CO2 SERPL-SCNC: 28 MMOL/L (ref 21–32)
CREAT SERPL-MCNC: 0.41 MG/DL (ref 0.6–1.2)
EOSINOPHIL NFR BLD AUTO: 260 K/UL (ref 130–400)
GLUCOSE SERPL-MCNC: 137 MG/DL (ref 70–99)
HCT VFR BLD CALC: 35.3 % (ref 37–47)
HGB BLD-MCNC: 11.3 G/DL (ref 12–16)
MCH RBC QN AUTO: 29.2 PG (ref 25–34)
MCHC RBC AUTO-ENTMCNC: 32 G/DL (ref 32–36)
MCV RBC AUTO: 91.2 FL (ref 80–100)
PMV BLD AUTO: 10.9 FL (ref 7.4–10.4)
POTASSIUM SERPL-SCNC: 3.6 MMOL/L (ref 3.5–5.1)
RED CELL DISTRIBUTION WIDTH CV: 16.3 % (ref 11.5–14.5)
RED CELL DISTRIBUTION WIDTH SD: 54.4 FL (ref 36.4–46.3)
SODIUM SERPL-SCNC: 136 MMOL/L (ref 136–145)
WBC # BLD AUTO: 6.51 K/UL (ref 4.8–10.8)